# Patient Record
Sex: MALE | Race: OTHER
[De-identification: names, ages, dates, MRNs, and addresses within clinical notes are randomized per-mention and may not be internally consistent; named-entity substitution may affect disease eponyms.]

---

## 2018-08-22 ENCOUNTER — HOSPITAL ENCOUNTER (OUTPATIENT)
Dept: HOSPITAL 88 - CT | Age: 78
End: 2018-08-22
Attending: INTERNAL MEDICINE
Payer: MEDICARE

## 2018-08-22 DIAGNOSIS — E66.3: ICD-10-CM

## 2018-08-22 DIAGNOSIS — R10.11: ICD-10-CM

## 2018-08-22 DIAGNOSIS — R10.13: Primary | ICD-10-CM

## 2018-08-22 DIAGNOSIS — Z71.3: ICD-10-CM

## 2018-08-22 LAB
BUN SERPL-MCNC: 7 MG/DL (ref 7–26)
BUN/CREAT SERPL: 7 (ref 6–25)
EGFRCR SERPLBLD CKD-EPI 2021: > 60 ML/MIN (ref 60–?)

## 2018-08-22 PROCEDURE — 82565 ASSAY OF CREATININE: CPT

## 2018-08-22 PROCEDURE — 84520 ASSAY OF UREA NITROGEN: CPT

## 2018-08-22 PROCEDURE — 74160 CT ABDOMEN W/CONTRAST: CPT

## 2018-08-22 PROCEDURE — 36415 COLL VENOUS BLD VENIPUNCTURE: CPT

## 2018-08-22 NOTE — DIAGNOSTIC IMAGING REPORT
PROCEDURE: CT ABDOMEN WITH CONTRAST

 

TECHNIQUE: 

The abdomen was scanned utilizing a multidetector helical scanner from 

the diaphragm to the iliac crest after the IV administration of 100 cc 

of Isovue 370 and the oral administration of 900 cc of water.  Coronal 

and sagittal multiplanar reformations were obtained.

 

COMPARISON: None.

 

INDICATIONS:   ABDOMEN PAIN

 

FINDINGS:

LOWER THORAX: Coronary atherosclerosis. 

 

HEPATOBILIARY: Status post cholecystectomy. No focal hepatic lesions.  

No biliary ductal dilatation. Two punctate calcifications are present 

adjacent to the liver posteriorly, which may reflect dropped gallstones 

or granulomas. 

SPLEEN: No splenomegaly.

PANCREAS: No focal masses or ductal dilatation.

 

ADRENALS: No adrenal nodules.

KIDNEYS: No hydronephrosis, stones, or solid mass lesions.

 

PERITONEUM / RETROPERITONEUM: No free air or fluid.

LYMPH NODES: No lymphadenopathy.

VESSELS: Unremarkable.

 

GI TRACT: Visualized portions of the bowel demonstrate no distention or 

wall thickening. Multiple surgical clips project in the upper abdomen 

adjacent to the stomach.  Partially seen anastomosis at the sigmoid 

colon. 

 

BONES AND SOFT TISSUES: No acute osseous abnormality. No suspicious 

lytic or blastic lesion. 

 

IMPRESSION:  

 

No acute abnormality in the abdomen. 

 

Dictated by:  ROMÁN COOLEY M.D. on 8/22/2018 at 12:30     

Electronically approved by:  ROMÁN COOLEY M.D. on 8/22/2018 at 12:30